# Patient Record
Sex: FEMALE | HISPANIC OR LATINO | URBAN - METROPOLITAN AREA
[De-identification: names, ages, dates, MRNs, and addresses within clinical notes are randomized per-mention and may not be internally consistent; named-entity substitution may affect disease eponyms.]

---

## 2018-08-01 ENCOUNTER — OFFICE VISIT (OUTPATIENT)
Dept: URBAN - METROPOLITAN AREA CLINIC 48 | Facility: CLINIC | Age: 58
End: 2018-08-01
Payer: MEDICAID

## 2018-08-01 DIAGNOSIS — E11.3291 TYPE 2 DIAB W MILD NONPRLF DIABETIC RTNOP W/O MACULAR EDEMA, RIGHT EYE: Primary | ICD-10-CM

## 2018-08-01 PROCEDURE — 92014 COMPRE OPH EXAM EST PT 1/>: CPT | Performed by: OPHTHALMOLOGY

## 2018-08-01 ASSESSMENT — INTRAOCULAR PRESSURE
OD: 15
OS: 14

## 2018-08-01 NOTE — IMPRESSION/PLAN
Impression: Type 2 diab w mild nonprlf diabetic rtnop w/o macular edema, right eye: [id number].  Plan: Advised to obtain good blood glucose control

rtc 1 year

## 2020-01-17 ENCOUNTER — OFFICE VISIT (OUTPATIENT)
Dept: URBAN - METROPOLITAN AREA CLINIC 48 | Facility: CLINIC | Age: 60
End: 2020-01-17
Payer: COMMERCIAL

## 2020-01-17 PROCEDURE — 92004 COMPRE OPH EXAM NEW PT 1/>: CPT | Performed by: OPTOMETRIST

## 2020-01-17 PROCEDURE — 92134 CPTRZ OPH DX IMG PST SGM RTA: CPT | Performed by: OPTOMETRIST

## 2020-01-17 ASSESSMENT — INTRAOCULAR PRESSURE
OS: 13
OD: 12

## 2020-01-17 NOTE — IMPRESSION/PLAN
Impression: Type 2 diab w moderate nonprlf diab rtnop w/o macular edema, bilateral: M85.4051. Plan: Advised patient to check amsler grid weekly, if changes call the office.

## 2020-06-23 ENCOUNTER — OFFICE VISIT (OUTPATIENT)
Dept: URBAN - METROPOLITAN AREA CLINIC 48 | Facility: CLINIC | Age: 60
End: 2020-06-23
Payer: COMMERCIAL

## 2020-06-23 DIAGNOSIS — E11.9 TYPE 2 DIABETES MELLITUS W/O COMPLICATION: Primary | ICD-10-CM

## 2020-06-23 PROCEDURE — 92014 COMPRE OPH EXAM EST PT 1/>: CPT | Performed by: OPHTHALMOLOGY

## 2020-06-23 ASSESSMENT — INTRAOCULAR PRESSURE
OD: 17
OS: 15

## 2020-06-23 NOTE — IMPRESSION/PLAN
Impression: Type 2 diabetes mellitus w/o complication: L93.8.  Plan: No diabetic retinopathy on today's exam

Recommend yearly diabetic exam with Dr. Seema Huntley or Dr. Tarah Fonseca

## 2021-07-15 ENCOUNTER — OFFICE VISIT (OUTPATIENT)
Dept: URBAN - METROPOLITAN AREA CLINIC 48 | Facility: CLINIC | Age: 61
End: 2021-07-15
Payer: COMMERCIAL

## 2021-07-15 DIAGNOSIS — H25.13 AGE-RELATED NUCLEAR CATARACT, BILATERAL: ICD-10-CM

## 2021-07-15 DIAGNOSIS — E11.3393 TYPE 2 DIAB W MODERATE NONPRLF DIAB RTNOP W/O MACULAR EDEMA, BILATERAL: Primary | ICD-10-CM

## 2021-07-15 PROCEDURE — 99214 OFFICE O/P EST MOD 30 MIN: CPT | Performed by: OPTOMETRIST

## 2021-07-15 ASSESSMENT — INTRAOCULAR PRESSURE
OS: 13
OD: 15

## 2021-07-15 NOTE — IMPRESSION/PLAN
Impression: Type 2 diab w moderate nonprlf diab rtnop w/o macular edema, bilateral: L75.2294. Plan: No CSME on exam or OCT scan. No treatment is needed. Will continue to monitor. Discussed with patient the importance of glucose control and ocular risk to prevent the progression to Retinopathy.  

Follow up annually with dilated fundus exam.

## 2023-01-11 ENCOUNTER — OFFICE VISIT (OUTPATIENT)
Dept: URBAN - METROPOLITAN AREA CLINIC 48 | Facility: CLINIC | Age: 63
End: 2023-01-11
Payer: COMMERCIAL

## 2023-01-11 DIAGNOSIS — H25.813 COMBINED FORMS OF AGE-RELATED CATARACT, BILATERAL: ICD-10-CM

## 2023-01-11 DIAGNOSIS — E11.3293 TYPE 2 DIAB W MILD NONPRLF DIABETIC RTNOP W/O MACULAR EDEMA, BILATERAL: Primary | ICD-10-CM

## 2023-01-11 PROCEDURE — 92134 CPTRZ OPH DX IMG PST SGM RTA: CPT | Performed by: OPTOMETRIST

## 2023-01-11 PROCEDURE — 92014 COMPRE OPH EXAM EST PT 1/>: CPT | Performed by: OPTOMETRIST

## 2023-01-11 ASSESSMENT — INTRAOCULAR PRESSURE
OD: 12
OS: 13

## 2023-01-11 NOTE — IMPRESSION/PLAN
Impression: Combined forms of age-related cataract, bilateral: H25.813. Plan: Discussed diagnosis in detail with patient. No treatment is required at this time. Will continue to observe condition and or symptoms. Call if [address] worsens.

## 2023-01-11 NOTE — IMPRESSION/PLAN
Impression: Type 2 diab w mild nonprlf diabetic rtnop w/o macular edema, bilateral: H56.6548. Plan: OCT of macula ordered and performed today ou. No DME OU. No signs of neovascularization noted. Patient was instructed to monitor vision for sudden changes and to call if visual changes noted. Discussed ocular and systemic benefits of blood sugar control. Continue blood sugar control.  RTC 9mns DE OCT

## 2023-10-23 ENCOUNTER — OFFICE VISIT (OUTPATIENT)
Dept: URBAN - METROPOLITAN AREA CLINIC 48 | Facility: CLINIC | Age: 63
End: 2023-10-23
Payer: COMMERCIAL

## 2023-10-23 DIAGNOSIS — D23.122 OTHER BENIGN NEOPLASM SKIN/ LEFT LOWER EYELID, INC CANTHUS: ICD-10-CM

## 2023-10-23 DIAGNOSIS — E11.3293 TYPE 2 DIAB W MILD NONPRLF DIABETIC RTNOP W/O MACULAR EDEMA, BILATERAL: Primary | ICD-10-CM

## 2023-10-23 PROCEDURE — 92004 COMPRE OPH EXAM NEW PT 1/>: CPT | Performed by: STUDENT IN AN ORGANIZED HEALTH CARE EDUCATION/TRAINING PROGRAM

## 2023-10-23 PROCEDURE — 92134 CPTRZ OPH DX IMG PST SGM RTA: CPT | Performed by: STUDENT IN AN ORGANIZED HEALTH CARE EDUCATION/TRAINING PROGRAM

## 2023-10-23 ASSESSMENT — INTRAOCULAR PRESSURE
OD: 17
OS: 20